# Patient Record
(demographics unavailable — no encounter records)

---

## 2025-02-19 NOTE — HISTORY OF PRESENT ILLNESS
[FreeTextEntry1] : cpe/est care needs new pcp, moved here from [place] [de-identified] : Edmar Maradiaga is a 32 year M who presents for CPE/est care PMHx HLD Feels well overall

## 2025-02-19 NOTE — HEALTH RISK ASSESSMENT
[Yes] : Yes [4 or more  times a week (4 pts)] : 4 or more  times a week (4 points) [1 or 2 (0 pts)] : 1 or 2 (0 points) [Never (0 pts)] : Never (0 points) [No] : In the past 12 months have you used drugs other than those required for medical reasons? No [0] : 2) Feeling down, depressed, or hopeless: Not at all (0) [PHQ-2 Negative - No further assessment needed] : PHQ-2 Negative - No further assessment needed [Employed] : employed [Significant Other] : lives with significant other [# Of Children ___] : has [unfilled] children [Feels Safe at Home] : Feels safe at home [Fully functional (bathing, dressing, toileting, transferring, walking, feeding)] : Fully functional (bathing, dressing, toileting, transferring, walking, feeding) [Fully functional (using the telephone, shopping, preparing meals, housekeeping, doing laundry, using] : Fully functional and needs no help or supervision to perform IADLs (using the telephone, shopping, preparing meals, housekeeping, doing laundry, using transportation, managing medications and managing finances) [Never] : Never [HIV test declined] : HIV test declined [Hepatitis C test declined] : Hepatitis C test declined [Audit-CScore] : 4 [[id number]] : 0 [de-identified] : girlfriend [FreeTextEntry2] : [place] [facility]ologist (naida tao)

## 2025-02-19 NOTE — ASSESSMENT
[FreeTextEntry1] : Health Care Maintenance - well visit - routine labs ordered - depression screen negative - flu vaccine- reports 10/2024 - covid vaccines- recommend this season's dose  - tdap reports 2024 - advised to get annual eye exams with optometry/ophthalmology, skin exams with dermatology, and dental exams  HLD -recent lipid panels reviewed on patient's phone:  (2023) -> 170 (2024) -check cmp and lipid panel -if remains elevated, discussed considering CT calcium score to determine need for statin at this time  Elevated bp -bp high initially, repeat improved -lifestyle modifications including dietary changes, avoiding excess salt, decreasing alcohol intake, increasing CV exercise -discussed parameters of normal bp -to check bps at home and rtc if elevated